# Patient Record
Sex: MALE | Race: BLACK OR AFRICAN AMERICAN | Employment: PART TIME | URBAN - METROPOLITAN AREA
[De-identification: names, ages, dates, MRNs, and addresses within clinical notes are randomized per-mention and may not be internally consistent; named-entity substitution may affect disease eponyms.]

---

## 2023-01-31 ENCOUNTER — HOSPITAL ENCOUNTER (EMERGENCY)
Age: 38
Discharge: HOME | End: 2023-01-31
Attending: EMERGENCY MEDICINE

## 2023-01-31 ENCOUNTER — APPOINTMENT (OUTPATIENT)
Dept: CT IMAGING | Age: 38
End: 2023-01-31

## 2023-01-31 ENCOUNTER — APPOINTMENT (OUTPATIENT)
Dept: GENERAL RADIOLOGY | Age: 38
End: 2023-01-31

## 2023-01-31 VITALS
HEART RATE: 71 BPM | RESPIRATION RATE: 16 BRPM | HEIGHT: 69 IN | DIASTOLIC BLOOD PRESSURE: 81 MMHG | OXYGEN SATURATION: 97 % | WEIGHT: 180 LBS | BODY MASS INDEX: 26.66 KG/M2 | TEMPERATURE: 98.1 F | SYSTOLIC BLOOD PRESSURE: 112 MMHG

## 2023-01-31 DIAGNOSIS — S09.90XA INJURY OF HEAD, INITIAL ENCOUNTER: Primary | ICD-10-CM
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
## 2023-01-31 LAB
ABO/RH: NORMAL
ANION GAP SERPL CALCULATED.3IONS-SCNC: 21 MMOL/L (ref 9–17)
ANTIBODY SCREEN: NEGATIVE
ARM BAND NUMBER: NORMAL
BLOOD BANK SPECIMEN: ABNORMAL
BUN BLDV-MCNC: 14 MG/DL (ref 6–20)
CARBOXYHEMOGLOBIN: 2.4 % (ref 0–5)
CHLORIDE BLD-SCNC: 97 MMOL/L (ref 98–107)
CO2: 17 MMOL/L (ref 20–31)
CREAT SERPL-MCNC: 1.17 MG/DL (ref 0.7–1.2)
ETHANOL PERCENT: <0.01 %
ETHANOL: <10 MG/DL
EXPIRATION DATE: NORMAL
FIO2: ABNORMAL
GFR SERPL CREATININE-BSD FRML MDRD: >60 ML/MIN/1.73M2
GLUCOSE BLD-MCNC: 185 MG/DL (ref 70–99)
HCG QUALITATIVE: ABNORMAL
HCO3 VENOUS: 18.1 MMOL/L (ref 24–30)
HCT VFR BLD CALC: 48.7 % (ref 40.7–50.3)
HEMOGLOBIN: 15.4 G/DL (ref 13–17)
INR BLD: 1.1
MCH RBC QN AUTO: 28.6 PG (ref 25.2–33.5)
MCHC RBC AUTO-ENTMCNC: 31.6 G/DL (ref 28.4–34.8)
MCV RBC AUTO: 90.4 FL (ref 82.6–102.9)
NEGATIVE BASE EXCESS, VEN: 8.8 MMOL/L (ref 0–2)
NRBC AUTOMATED: 0 PER 100 WBC
O2 SAT, VEN: 79.8 % (ref 60–85)
PARTIAL THROMBOPLASTIN TIME: 37.3 SEC (ref 20.5–30.5)
PATIENT TEMP: 37
PCO2, VEN: 43.5 MM HG (ref 39–55)
PDW BLD-RTO: 14.2 % (ref 11.8–14.4)
PH VENOUS: 7.24 (ref 7.32–7.42)
PLATELET # BLD: 223 K/UL (ref 138–453)
PMV BLD AUTO: 10.6 FL (ref 8.1–13.5)
PO2, VEN: 52.2 MM HG (ref 30–50)
POTASSIUM SERPL-SCNC: 3.8 MMOL/L (ref 3.7–5.3)
PROTHROMBIN TIME: 11.2 SEC (ref 9.1–12.3)
RBC # BLD: 5.39 M/UL (ref 4.21–5.77)
SODIUM BLD-SCNC: 135 MMOL/L (ref 135–144)
WBC # BLD: 11.5 K/UL (ref 3.5–11.3)

## 2023-01-31 PROCEDURE — 86850 RBC ANTIBODY SCREEN: CPT

## 2023-01-31 PROCEDURE — 3209999900 CT LUMBAR SPINE TRAUMA RECONSTRUCTION

## 2023-01-31 PROCEDURE — 71260 CT THORAX DX C+: CPT

## 2023-01-31 PROCEDURE — 86901 BLOOD TYPING SEROLOGIC RH(D): CPT

## 2023-01-31 PROCEDURE — 73110 X-RAY EXAM OF WRIST: CPT

## 2023-01-31 PROCEDURE — 82947 ASSAY GLUCOSE BLOOD QUANT: CPT

## 2023-01-31 PROCEDURE — 6360000004 HC RX CONTRAST MEDICATION: Performed by: STUDENT IN AN ORGANIZED HEALTH CARE EDUCATION/TRAINING PROGRAM

## 2023-01-31 PROCEDURE — 93005 ELECTROCARDIOGRAM TRACING: CPT | Performed by: STUDENT IN AN ORGANIZED HEALTH CARE EDUCATION/TRAINING PROGRAM

## 2023-01-31 PROCEDURE — 73030 X-RAY EXAM OF SHOULDER: CPT

## 2023-01-31 PROCEDURE — 85027 COMPLETE CBC AUTOMATED: CPT

## 2023-01-31 PROCEDURE — G0480 DRUG TEST DEF 1-7 CLASSES: HCPCS

## 2023-01-31 PROCEDURE — 84520 ASSAY OF UREA NITROGEN: CPT

## 2023-01-31 PROCEDURE — 85730 THROMBOPLASTIN TIME PARTIAL: CPT

## 2023-01-31 PROCEDURE — 85610 PROTHROMBIN TIME: CPT

## 2023-01-31 PROCEDURE — 12001 RPR S/N/AX/GEN/TRNK 2.5CM/<: CPT

## 2023-01-31 PROCEDURE — 82805 BLOOD GASES W/O2 SATURATION: CPT

## 2023-01-31 PROCEDURE — 84703 CHORIONIC GONADOTROPIN ASSAY: CPT

## 2023-01-31 PROCEDURE — 70450 CT HEAD/BRAIN W/O DYE: CPT

## 2023-01-31 PROCEDURE — 72125 CT NECK SPINE W/O DYE: CPT

## 2023-01-31 PROCEDURE — 82565 ASSAY OF CREATININE: CPT

## 2023-01-31 PROCEDURE — 3209999900 CT THORACIC SPINE TRAUMA RECONSTRUCTION

## 2023-01-31 PROCEDURE — 80051 ELECTROLYTE PANEL: CPT

## 2023-01-31 PROCEDURE — 99285 EMERGENCY DEPT VISIT HI MDM: CPT

## 2023-01-31 PROCEDURE — 86900 BLOOD TYPING SEROLOGIC ABO: CPT

## 2023-01-31 RX ADMIN — IOPAMIDOL 75 ML: 755 INJECTION, SOLUTION INTRAVENOUS at 15:13

## 2023-01-31 ASSESSMENT — PAIN - FUNCTIONAL ASSESSMENT
PAIN_FUNCTIONAL_ASSESSMENT: 0-10
PAIN_FUNCTIONAL_ASSESSMENT: 0-10

## 2023-01-31 ASSESSMENT — PAIN DESCRIPTION - PAIN TYPE: TYPE: ACUTE PAIN

## 2023-01-31 ASSESSMENT — PAIN DESCRIPTION - LOCATION: LOCATION: SHOULDER;NECK

## 2023-01-31 ASSESSMENT — ENCOUNTER SYMPTOMS
ALLERGIC/IMMUNOLOGIC NEGATIVE: 1
RESPIRATORY NEGATIVE: 1
BACK PAIN: 1
SHORTNESS OF BREATH: 0
GASTROINTESTINAL NEGATIVE: 1
EYES NEGATIVE: 1

## 2023-01-31 ASSESSMENT — PAIN SCALES - GENERAL
PAINLEVEL_OUTOF10: 8
PAINLEVEL_OUTOF10: 10

## 2023-01-31 ASSESSMENT — PAIN DESCRIPTION - ORIENTATION: ORIENTATION: LEFT
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
## 2023-01-31 NOTE — ED PROVIDER NOTES
[facility]     Emergency Department     Faculty Attestation    I performed a history and physical examination of the patient and discussed management with the resident. I reviewed the resident´s note and agree with the documented findings and plan of care. Any areas of disagreement are noted on the chart. I was personally present for the key portions of any procedures. I have documented in the chart those procedures where I was not present during the key portions. I have reviewed the emergency nurses triage note. I agree with the chief complaint, past medical history, past surgical history, allergies, medications, social and family history as documented unless otherwise noted below. For Physician Assistant/ Nurse Practitioner cases/documentation I have personally evaluated this patient and have completed at least one if not all key elements of the E/M (history, physical exam, and MDM). Additional findings are as noted.        Lyudmila Soto MD  01/31/23 1363
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
## 2023-02-01 LAB
EKG ATRIAL RATE: 82 BPM
EKG P AXIS: 76 DEGREES
EKG P-R INTERVAL: 148 MS
EKG Q-T INTERVAL: 368 MS
EKG QRS DURATION: 100 MS
EKG QTC CALCULATION (BAZETT): 429 MS
EKG R AXIS: 77 DEGREES
EKG T AXIS: 33 DEGREES
EKG VENTRICULAR RATE: 82 BPM

## 2023-02-01 PROCEDURE — 93010 ELECTROCARDIOGRAM REPORT: CPT | Performed by: INTERNAL MEDICINE
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
